# Patient Record
Sex: FEMALE | Race: BLACK OR AFRICAN AMERICAN | URBAN - METROPOLITAN AREA
[De-identification: names, ages, dates, MRNs, and addresses within clinical notes are randomized per-mention and may not be internally consistent; named-entity substitution may affect disease eponyms.]

---

## 2022-09-09 ENCOUNTER — HOSPITAL ENCOUNTER (OUTPATIENT)
Dept: LAB | Age: 42
Discharge: HOME | End: 2022-09-09
Payer: COMMERCIAL

## 2022-09-09 ENCOUNTER — OFFICE VISIT (OUTPATIENT)
Dept: FAMILY MEDICINE CLINIC | Age: 42
End: 2022-09-09
Payer: OTHER GOVERNMENT

## 2022-09-09 VITALS
TEMPERATURE: 98.1 F | SYSTOLIC BLOOD PRESSURE: 149 MMHG | OXYGEN SATURATION: 98 % | DIASTOLIC BLOOD PRESSURE: 91 MMHG | WEIGHT: 206.8 LBS | RESPIRATION RATE: 16 BRPM | HEIGHT: 69 IN | BODY MASS INDEX: 30.63 KG/M2 | HEART RATE: 62 BPM

## 2022-09-09 DIAGNOSIS — R53.82 CHRONIC FATIGUE: ICD-10-CM

## 2022-09-09 DIAGNOSIS — Z83.3 FAMILY HISTORY OF DIABETES MELLITUS IN FATHER: ICD-10-CM

## 2022-09-09 DIAGNOSIS — G89.29 CHRONIC PAIN OF RIGHT KNEE: ICD-10-CM

## 2022-09-09 DIAGNOSIS — R03.0 ELEVATED BP WITHOUT DIAGNOSIS OF HYPERTENSION: ICD-10-CM

## 2022-09-09 DIAGNOSIS — M25.561 CHRONIC PAIN OF RIGHT KNEE: ICD-10-CM

## 2022-09-09 DIAGNOSIS — D17.9 MULTIPLE LIPOMAS: ICD-10-CM

## 2022-09-09 DIAGNOSIS — Z86.2 HISTORY OF IRON DEFICIENCY ANEMIA: ICD-10-CM

## 2022-09-09 DIAGNOSIS — Z13.220 LIPID SCREENING: ICD-10-CM

## 2022-09-09 DIAGNOSIS — Z82.49 FAMILY HISTORY OF HYPERTENSION IN MOTHER: ICD-10-CM

## 2022-09-09 DIAGNOSIS — Z12.31 BREAST CANCER SCREENING BY MAMMOGRAM: Primary | ICD-10-CM

## 2022-09-09 LAB
25(OH)D3 SERPL-MCNC: 8.7 NG/ML (ref 30–100)
ALBUMIN SERPL-MCNC: 4 G/DL (ref 3.4–5)
ALBUMIN/GLOB SERPL: 1.1 {RATIO} (ref 0.8–1.7)
ALP SERPL-CCNC: 118 U/L (ref 45–117)
ALT SERPL-CCNC: 36 U/L (ref 13–56)
ANION GAP SERPL CALC-SCNC: 5 MMOL/L (ref 3–18)
AST SERPL-CCNC: 21 U/L (ref 10–38)
BASOPHILS # BLD: 0.1 K/UL (ref 0–0.1)
BASOPHILS NFR BLD: 1 % (ref 0–2)
BILIRUB SERPL-MCNC: 0.4 MG/DL (ref 0.2–1)
BUN SERPL-MCNC: 11 MG/DL (ref 7–18)
BUN/CREAT SERPL: 15 (ref 12–20)
CALCIUM SERPL-MCNC: 9.7 MG/DL (ref 8.5–10.1)
CHLORIDE SERPL-SCNC: 108 MMOL/L (ref 100–111)
CHOLEST SERPL-MCNC: 221 MG/DL
CO2 SERPL-SCNC: 27 MMOL/L (ref 21–32)
CREAT SERPL-MCNC: 0.75 MG/DL (ref 0.6–1.3)
DIFFERENTIAL METHOD BLD: ABNORMAL
EOSINOPHIL # BLD: 0.2 K/UL (ref 0–0.4)
EOSINOPHIL NFR BLD: 2 % (ref 0–5)
ERYTHROCYTE [DISTWIDTH] IN BLOOD BY AUTOMATED COUNT: 18 % (ref 11.6–14.5)
ERYTHROCYTE [SEDIMENTATION RATE] IN BLOOD: 23 MM/HR (ref 0–20)
EST. AVERAGE GLUCOSE BLD GHB EST-MCNC: 120 MG/DL
GLOBULIN SER CALC-MCNC: 3.8 G/DL (ref 2–4)
GLUCOSE SERPL-MCNC: 101 MG/DL (ref 74–99)
HBA1C MFR BLD: 5.8 % (ref 4.2–5.6)
HCT VFR BLD AUTO: 33 % (ref 35–45)
HDLC SERPL-MCNC: 67 MG/DL (ref 40–60)
HDLC SERPL: 3.3 {RATIO} (ref 0–5)
HGB BLD-MCNC: 10.5 G/DL (ref 12–16)
IMM GRANULOCYTES # BLD AUTO: 0 K/UL (ref 0–0.04)
IMM GRANULOCYTES NFR BLD AUTO: 0 % (ref 0–0.5)
LDLC SERPL CALC-MCNC: 144.6 MG/DL (ref 0–100)
LIPID PROFILE,FLP: ABNORMAL
LYMPHOCYTES # BLD: 2.1 K/UL (ref 0.9–3.6)
LYMPHOCYTES NFR BLD: 33 % (ref 21–52)
MCH RBC QN AUTO: 23.8 PG (ref 24–34)
MCHC RBC AUTO-ENTMCNC: 31.8 G/DL (ref 31–37)
MCV RBC AUTO: 74.8 FL (ref 78–100)
MONOCYTES # BLD: 0.4 K/UL (ref 0.05–1.2)
MONOCYTES NFR BLD: 6 % (ref 3–10)
NEUTS SEG # BLD: 3.6 K/UL (ref 1.8–8)
NEUTS SEG NFR BLD: 58 % (ref 40–73)
NRBC # BLD: 0 K/UL (ref 0–0.01)
NRBC BLD-RTO: 0 PER 100 WBC
PLATELET # BLD AUTO: 397 K/UL (ref 135–420)
PMV BLD AUTO: 11.5 FL (ref 9.2–11.8)
POTASSIUM SERPL-SCNC: 4.3 MMOL/L (ref 3.5–5.5)
PROT SERPL-MCNC: 7.8 G/DL (ref 6.4–8.2)
RBC # BLD AUTO: 4.41 M/UL (ref 4.2–5.3)
SODIUM SERPL-SCNC: 140 MMOL/L (ref 136–145)
TRIGL SERPL-MCNC: 47 MG/DL (ref ?–150)
TSH SERPL DL<=0.05 MIU/L-ACNC: 1.49 UIU/ML (ref 0.36–3.74)
VLDLC SERPL CALC-MCNC: 9.4 MG/DL
WBC # BLD AUTO: 6.2 K/UL (ref 4.6–13.2)

## 2022-09-09 PROCEDURE — 36415 COLL VENOUS BLD VENIPUNCTURE: CPT

## 2022-09-09 PROCEDURE — 80061 LIPID PANEL: CPT

## 2022-09-09 PROCEDURE — 99203 OFFICE O/P NEW LOW 30 MIN: CPT | Performed by: NURSE PRACTITIONER

## 2022-09-09 PROCEDURE — 85025 COMPLETE CBC W/AUTO DIFF WBC: CPT

## 2022-09-09 PROCEDURE — 85652 RBC SED RATE AUTOMATED: CPT

## 2022-09-09 PROCEDURE — 80053 COMPREHEN METABOLIC PANEL: CPT

## 2022-09-09 PROCEDURE — 82306 VITAMIN D 25 HYDROXY: CPT

## 2022-09-09 PROCEDURE — 86431 RHEUMATOID FACTOR QUANT: CPT

## 2022-09-09 PROCEDURE — 83036 HEMOGLOBIN GLYCOSYLATED A1C: CPT

## 2022-09-09 PROCEDURE — 84443 ASSAY THYROID STIM HORMONE: CPT

## 2022-09-09 NOTE — PROGRESS NOTES
Patient states her last menstrual cycle was September 4, 2022. Patient do not have covid booster. Aminata Shea presents today for   Chief Complaint   Patient presents with    Establish Care    Knee Pain       Is someone accompanying this pt? no    Is the patient using any DME equipment during OV? no    Depression Screening:  3 most recent PHQ Screens 9/9/2022   Little interest or pleasure in doing things Not at all   Feeling down, depressed, irritable, or hopeless Not at all   Total Score PHQ 2 0       Learning Assessment:  Learning Assessment 9/9/2022   PRIMARY LEARNER Patient   HIGHEST LEVEL OF EDUCATION - PRIMARY LEARNER  GRADUATED HIGH SCHOOL OR GED   BARRIERS PRIMARY LEARNER NONE   PRIMARY LANGUAGE ENGLISH   LEARNER PREFERENCE PRIMARY DEMONSTRATION   ANSWERED BY Patient   RELATIONSHIP SELF       Health Maintenance reviewed and discussed and ordered per Provider. Health Maintenance Due   Topic Date Due    Depression Screen  Never done    DTaP/Tdap/Td series (1 - Tdap) Never done    Cervical cancer screen  Never done    COVID-19 Vaccine (3 - Booster for Pfizer series) 11/23/2021   . Coordination of Care:  1. Have you been to the ER, urgent care clinic since your last visit? Hospitalized since your last visit? No     2. Have you seen or consulted any other health care providers outside of the [address] [place] [address] since your last visit? Include any pap smears or colon screening. No          3. For patients aged 39-70: Has the patient had a colonoscopy / FIT/ Cologuard? NA - based on age      If the patient is female:    4. For patients aged 41-77: Has the patient had a mammogram within the past 2 years? No       5. For patients aged 21-65: Has the patient had a pap smear? No Patient states she got this done last year in November.

## 2022-09-09 NOTE — PROGRESS NOTES
The Aminata Shea 37 y.o. 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 Negative for chills, fever and weight loss. Eyes: Negative. Respiratory:  Negative for cough, shortness of breath and wheezing. Cardiovascular:  Negative for chest pain, palpitations and leg swelling. Gastrointestinal:  Negative for abdominal pain, blood in stool, constipation and diarrhea. Genitourinary:  Negative for frequency, hematuria and urgency. Musculoskeletal:  Positive for joint pain. Negative for back pain, myalgias and neck pain. Right knee pain   Skin:         Lump to right shoulder/left shoulder, upper back and middle back    Neurological:  Negative for dizziness, weakness and headaches. Endo/Heme/Allergies:  Negative for environmental allergies. Does not bruise/bleed easily. Psychiatric/Behavioral:  Negative for depression, substance abuse and suicidal ideas. The patient is not nervous/anxious and does not have insomnia. Health Maintenance Due   Topic Date Due    Hepatitis C Screening  Never done    Depression Screen  Never done    DTaP/Tdap/Td series (1 - Tdap) Never done    Cervical cancer screen  Never done    Lipid Screen  Never done    COVID-19 Vaccine (3 - Booster for Pfizer series) 11/23/2021    Flu Vaccine (1) Never done        History reviewed. No pertinent past medical history. Physical Exam  Constitutional:       General: She is not in acute distress. Appearance: She is obese. She is not toxic-appearing. Cardiovascular:      Rate and Rhythm: Normal rate and regular rhythm. Pulses: Normal pulses. Heart sounds: Normal heart sounds. No murmur heard.   Pulmonary:      Effort: Pulmonary effort is normal. No respiratory distress. Breath sounds: Normal breath sounds. No wheezing. Abdominal:      General: There is no distension. Palpations: Abdomen is soft. There is no mass. Tenderness: There is no abdominal tenderness. Hernia: No hernia is present. Musculoskeletal:         General: Normal range of motion. Right lower leg: No edema. Left lower leg: No edema. Skin:     Comments: Upper back golf ball mobile mass-tender  Left flank softball mobile mass  Right shoulder golf ball size mass-tender  Left inner arm golf ball    Neurological:      General: No focal deficit present. Mental Status: She is alert and oriented to person, place, and time. Visit Vitals  BP (!) 149/91   Pulse 62   Temp 98.1 °F (36.7 °C) (Temporal)   Resp 16   Ht 5' 9\" (1.753 m)   Wt 206 lb 12.8 oz (93.8 kg)   SpO2 98%   BMI 30.54 kg/m²       Current Outpatient Medications:     ergocalciferol (ERGOCALCIFEROL) 1,250 mcg (50,000 unit) capsule, Take 1 Capsule by mouth every seven (7) days. , Disp: 12 Capsule, Rfl: 2    omega 3-DHA-EPA-fish oil (Fish OiL) 1,000 mg (120 mg-180 mg) capsule, Take 1 Capsule by mouth daily. , Disp: 90 Capsule, Rfl: 2    ferrous gluconate 324 mg (38 mg iron) tablet, Take  by mouth Daily (before breakfast). , Disp: , Rfl:         ASSESSMENT and PLAN    ICD-10-CM ICD-9-CM    1. Breast cancer screening by mammogram  Z12.31 V76.12 [facility] MAMMO BI SCREENING INCL CAD      2. Family history of hypertension in mother  Z80.55 V14.46       3. Elevated BP without diagnosis of hypertension  J08.3 146.0 METABOLIC PANEL, COMPREHENSIVE      4. Family history of diabetes mellitus in father  Z83.3 V18.0 HEMOGLOBIN A1C WITH EAG      5. History of iron deficiency anemia  Z86.2 V12.3 CBC WITH AUTOMATED DIFF      6. Chronic pain of right knee  M25.561 719.46 XR KNEE RT MIN 4 V    G89.29 338.29 SED RATE (ESR)      RHEUMATOID FACTOR, IGM      7.  Chronic fatigue  R53.82 780.79 VITAMIN D, 25 HYDROXY      TSH 3RD GENERATION      8.  Lipid screening  Z13.220 V77.91 LIPID PANEL      9. Multiple lipomas  D17.9 214.9 REFERRAL TO SURGERY        Follow-up and Dispositions    Return in about 2 weeks (around 9/23/2022) for BP check and then 4 month f/u .       lab results and schedule of future lab studies reviewed with patient  reviewed diet, exercise and weight control  cardiovascular risk and specific lipid/LDL goals reviewed  reviewed medications and side effects in detail    Noe Lockett NP

## 2022-09-13 ENCOUNTER — OFFICE VISIT (OUTPATIENT)
Dept: SURGERY | Age: 42
End: 2022-09-13
Payer: COMMERCIAL

## 2022-09-13 VITALS
OXYGEN SATURATION: 100 % | BODY MASS INDEX: 30.66 KG/M2 | WEIGHT: 207 LBS | HEART RATE: 66 BPM | SYSTOLIC BLOOD PRESSURE: 145 MMHG | TEMPERATURE: 97.3 F | DIASTOLIC BLOOD PRESSURE: 85 MMHG | HEIGHT: 69 IN

## 2022-09-13 DIAGNOSIS — R22.2 MASS ON BACK: ICD-10-CM

## 2022-09-13 DIAGNOSIS — M79.89 SOFT TISSUE MASS: Primary | ICD-10-CM

## 2022-09-13 DIAGNOSIS — R19.00 LEFT FLANK MASS: ICD-10-CM

## 2022-09-13 DIAGNOSIS — R22.31 ARM MASS, RIGHT: ICD-10-CM

## 2022-09-13 DIAGNOSIS — R22.32 ARM MASS, LEFT: ICD-10-CM

## 2022-09-13 PROCEDURE — 99204 OFFICE O/P NEW MOD 45 MIN: CPT | Performed by: SURGERY

## 2022-09-13 NOTE — PROGRESS NOTES
Markus Lopez is a 37 y.o. female (: 1980) presenting to address:    Chief Complaint   Patient presents with    New Patient     Masses on mid upper back, left side of abd, and bilateral inner arms/tender to touch/ referred by Rossi Espinal NP       Medication list and allergies have been reviewed with Makrus Lopez and updated as of today's date. I have gone over all Medical, Surgical and Social History with Markus Lopez and updated/added the information accordingly.

## 2022-09-13 NOTE — PATIENT INSTRUCTIONS
If you have any questions or concerns about today's appointment, the verbal and/or written instructions you were given for follow up care, please call our office at [phone]. [facility] - [address]    [phone] office  [phone] fax       PATIENT PRE AND POST OPERATIVE INSTRUCTIONS       [facility]   [place], [address]  [phone]    Before Surgery Instructions:   1) You must have someone available to drive you to and from your procedure and stay with you for the first 24 hours. 2) It is very important that you have nothing to eat or drink after midnight the night before your surgery. This includes chewing gum or sucking on hard candy. Take only heart, blood pressure and cholesterol medications the morning of surgery with only a sip of water. 3) Please stop taking Plavix 5-7 days prior to your surgery with prescribing physician approval.  Stop taking Coumadin 5 days prior to your surgery with prescribing physician approval.  Stop taking all Aspirin or Aspirin containing products 7 days prior to your surgery. Stop taking Advil, Motrin, Aleve, and etc. 3 days prior to your surgery. 4) If you take any diabetic medications please consult with your primary care physician on how to take them on the day of your surgery  5) Please stop all Herbal products 2 weeks prior to your surgery. 6) Please arrive at the hospital 2 hours prior to your surgery, unless you have been otherwise instructed. 7) Patients having an operation on their colon will be given a separate instruction sheet on their Bowel Prep. 8) For any pre-operative work up check in at the main entrance to [facility], and then go to Patient Registration. These studies are done on a walk in basis they are open from 7:00am to 5:00pm Monday through Friday.   9) A urine drug screen will be performed on the day of surgery. Please be advised if your drug screen test result is positive for any illegal substances your surgery is subject to cancellation. 10) Please wash your surgical site the morning of your surgery with soap and water. 11) If you are of child bearing age you will have pregnancy test done the morning of your surgery as soon as you arrive. 12) You're surgery time is subject to change. At times this is necessary due to equipment or staffing needs. Please be advised it's your responsibility to notify our office of any changes to your healthcare coverage. Failure to notify our office of any changes to your health care coverage may result in denial of payment by your health insurance for all incurred services and you would be responsible for payment for all incurred services. After Surgery Instructions: You will need to be seen in the office for a follow-up visit 7-14 days after your surgery. Please call after you have had the procedure to make this appointment. Unless otherwise instructed, you may remove your outer bandage and shower 48 hours after your surgery. If you develop a fever greater than 101, have any significant drainage, bleeding, swelling and/or pus of the wound. Please call our office immediately. Surgery Date and Time:  Wednesday, September 28, 2022 at 12:30pm     Please enter DR. ROBERT[facility]S [facility]\"" main entrance on the first floor and go to Patient Registration. Once registered, a member of our team will escort you to the second floor. Please check in by 10:30am the day of your surgery. You may contact Jesse Mix with any questions at [phone].

## 2022-09-13 NOTE — PROGRESS NOTES
General Surgery Consult    Tona Urena  Admit date: (Not on file)    MRN: [id number]     : 1980     Age: 37 y.o. Attending Physician: Taylor Mcmahon MD, [facility]      History of Present Illness:      Tona Urena is a 37 y.o. female who was referred to me by Jason Handley for evaluation of multiple soft tissue masses. When I asked the patient about her family history she stated that only her sister had a soft tissue mass on her right shoulder it was only 1 and no one in the family has multiple soft tissue masses. She stated that 2 of the mass is the one located on the back and left flank has been there for many years including maybe 4 to 5 years and the one on the left arm and the right arm has been there for a year or 2. According to her the one on the left flank is the largest and during the day have slightly increased in size but it is very minimal.  They are causing some discomfort sometimes but no significant pain. The patient would like them to be removed. There are no problems to display for this patient. History reviewed. No pertinent past medical history. Past Surgical History:   Procedure Laterality Date    HX BREAST REDUCTION Bilateral     2017    HX  SECTION      x 3    HX TUBAL LIGATION      2017      Social History     Tobacco Use    Smoking status: Never    Smokeless tobacco: Never   Substance Use Topics    Alcohol use: Yes     Comment: occ      Social History     Tobacco Use   Smoking Status Never   Smokeless Tobacco Never     Family History   Problem Relation Age of Onset    High Cholesterol Mother     Hypertension Mother     Diabetes Father     Cancer Sister       No current outpatient medications on file. No current facility-administered medications for this visit. No Known Allergies       Review of Systems:  Pertinent items are noted in the History of Present Illness.     Objective:     Visit Vitals  BP (!) 145/85 (BP 1 Location: Left upper arm, BP Patient Position: Sitting, BP Cuff Size: Large adult)   Pulse 66   Temp 97.3 °F (36.3 °C) (Skin)   Ht 5' 9\" (1.753 m)   Wt 93.9 kg (207 lb)   LMP 09/03/2022   SpO2 100%   BMI 30.57 kg/m²       Physical Exam:      General:  in no apparent distress, alert, oriented times 3, and afebrile   Eyes:  conjunctivae and sclerae normal, pupils equal, round, reactive to light   Throat & Neck: no erythema or exudates noted and neck supple and symmetrical; no palpable masses   Lungs:   clear to auscultation bilaterally   Heart:  Regular rate and rhythm   Abdomen:   rounded, soft, nontender, nondistended, no masses or organomegaly. There is a relatively large left flank mass measuring about 5 to 7 cm and it seems to be soft nontender consistent with a lipoma. Extremities: On the left upper arm there is a soft tissue mass measuring around 3 to 4 cm that is soft nontender and multiloculated consistent with a possible lipoma. On the right upper arm there is a similar mass that is also about 3 to 4 cm and also it is soft nontender and easily movable consistent with also a possible lipoma. Back: There is a soft tissue mass on the back located in the middle that seems to be deep intramuscular lipoma that is around 4 to 5 cm and it is soft and partially movable because seems to be intramuscular.        Imaging and Lab Review:     CBC:   Lab Results   Component Value Date/Time    WBC 6.2 09/09/2022 10:25 AM    RBC 4.41 09/09/2022 10:25 AM    HGB 10.5 (L) 09/09/2022 10:25 AM    HCT 33.0 (L) 09/09/2022 10:25 AM    PLATELET 291 29/29/9209 10:25 AM     BMP:   Lab Results   Component Value Date/Time    Glucose 101 (H) 09/09/2022 10:25 AM    Sodium 140 09/09/2022 10:25 AM    Potassium 4.3 09/09/2022 10:25 AM    Chloride 108 09/09/2022 10:25 AM    CO2 27 09/09/2022 10:25 AM    BUN 11 09/09/2022 10:25 AM    Creatinine 0.75 09/09/2022 10:25 AM    Calcium 9.7 09/09/2022 10:25 AM     CMP:  Lab Results   Component Value Date/Time    Glucose 101 (H) 09/09/2022 10:25 AM    Sodium 140 09/09/2022 10:25 AM    Potassium 4.3 09/09/2022 10:25 AM    Chloride 108 09/09/2022 10:25 AM    CO2 27 09/09/2022 10:25 AM    BUN 11 09/09/2022 10:25 AM    Creatinine 0.75 09/09/2022 10:25 AM    Calcium 9.7 09/09/2022 10:25 AM    Anion gap 5 09/09/2022 10:25 AM    BUN/Creatinine ratio 15 09/09/2022 10:25 AM    Alk. phosphatase 118 (H) 09/09/2022 10:25 AM    Protein, total 7.8 09/09/2022 10:25 AM    Albumin 4.0 09/09/2022 10:25 AM    Globulin 3.8 09/09/2022 10:25 AM    A-G Ratio 1.1 09/09/2022 10:25 AM       No results found for this or any previous visit (from the past 24 hour(s)). images and reports reviewed    Assessment:   Leanna Reed is a 37 y.o. female who is presenting with multiple soft tissue masses that all of them on examination looks like lipomas. The patient would like them to be reexcised and I explained to her that it to be done under anesthesia in the operating room because the one on the left flank and the back are deep and large and having to give her local anesthesia in the office for 4 different location and that the pain is challenging. I also gave her the option of observation but for sure she would like them to be removed so we decided to proceed with scheduling her surgery. Plan:      We will schedule the patient for excision of multiple soft tissue masses of the left flank, back, left arm and right arm    Please call me if you have any questions (cell phone: [phone])     Signed By: Trish Bedoya MD     September 13, 2022

## 2022-09-16 LAB — CANNABINOIDS BLD CFM-MCNC: <7 U (ref 0–15)

## 2022-09-23 ENCOUNTER — DOCUMENTATION ONLY (OUTPATIENT)
Dept: FAMILY MEDICINE CLINIC | Age: 42
End: 2022-09-23

## 2022-09-23 ENCOUNTER — TELEPHONE (OUTPATIENT)
Dept: SURGERY | Age: 42
End: 2022-09-23

## 2022-09-23 ENCOUNTER — CLINICAL SUPPORT (OUTPATIENT)
Dept: FAMILY MEDICINE CLINIC | Age: 42
End: 2022-09-23

## 2022-09-23 VITALS
HEIGHT: 69 IN | SYSTOLIC BLOOD PRESSURE: 138 MMHG | RESPIRATION RATE: 16 BRPM | WEIGHT: 208.2 LBS | TEMPERATURE: 98.1 F | HEART RATE: 69 BPM | DIASTOLIC BLOOD PRESSURE: 82 MMHG | BODY MASS INDEX: 30.84 KG/M2

## 2022-09-23 DIAGNOSIS — Z01.30 BP CHECK: Primary | ICD-10-CM

## 2022-09-23 DIAGNOSIS — E55.9 VITAMIN D DEFICIENCY: Primary | ICD-10-CM

## 2022-09-23 DIAGNOSIS — E78.2 MODERATE MIXED HYPERLIPIDEMIA NOT REQUIRING STATIN THERAPY: ICD-10-CM

## 2022-09-23 RX ORDER — FERROUS GLUCONATE 324(38)MG
TABLET ORAL
COMMUNITY

## 2022-09-23 RX ORDER — ERGOCALCIFEROL 1.25 MG/1
50000 CAPSULE ORAL
Qty: 12 CAPSULE | Refills: 2 | Status: SHIPPED | OUTPATIENT
Start: 2022-09-23

## 2022-09-23 RX ORDER — GLUCOSAM/CHONDRO/HERB 149/HYAL 750-100 MG
1 TABLET ORAL DAILY
Qty: 90 CAPSULE | Refills: 2 | Status: SHIPPED | OUTPATIENT
Start: 2022-09-23

## 2022-09-23 NOTE — TELEPHONE ENCOUNTER
Left voicemail message for Ms. aYron Valentin to contact our office inquiring if she received email sent earlier this week with surgery estimate/insurance benefit coverage/if she wish to proceed with scheduled surgery on Wednesday, September 28, 2022 or would prefer to postpone surgery until  is active. Insurance benefit limited coverage $500 for surgeon/$1000 for facility and any cost above this amount is patient responsibility.

## 2022-09-23 NOTE — PROGRESS NOTES
Notified patient of lab results. Vit D and fish oil sent to pharmacy. Pt will work on low cholesterol diet and recheck in 6 months. Pt has iron pills at home and will start taking them. Denies need for prescription at this time.

## 2022-09-23 NOTE — PROGRESS NOTES
Per DEYA Basurto instructions patient presents today for a blood pressure check. Patient seated and resting for 15 minutes with both feet flat on the floor. Blood pressure taken and documented. Reported blood pressure to DEYA Basurto.

## 2022-09-26 ENCOUNTER — TELEPHONE (OUTPATIENT)
Dept: SURGERY | Age: 42
End: 2022-09-26

## 2022-09-26 NOTE — TELEPHONE ENCOUNTER
Spoke to Ms. Prosper Ghosh regarding request to cancel Wednesday, September 28, 2022 surgery with Dr. Phuc Vital due to insurance coverage. Ms. Prosper Ghosh indicate she's in the process of enrolling in [facility] and Priyanka Morton, NP will be her PCM. Will request a [facility] prime referral after Priyanka Morton, NP is activated as her PCM/to reschedule surgery with Dr. Phuc Vital.

## 2022-10-07 ENCOUNTER — TELEPHONE (OUTPATIENT)
Dept: FAMILY MEDICINE CLINIC | Age: 42
End: 2022-10-07

## 2022-10-07 ENCOUNTER — TELEPHONE (OUTPATIENT)
Dept: SURGERY | Age: 42
End: 2022-10-07

## 2022-10-07 ENCOUNTER — PATIENT MESSAGE (OUTPATIENT)
Dept: FAMILY MEDICINE CLINIC | Age: 42
End: 2022-10-07

## 2022-10-07 DIAGNOSIS — D17.9 MULTIPLE LIPOMAS: Primary | ICD-10-CM

## 2022-10-07 NOTE — TELEPHONE ENCOUNTER
Fahad Paul called inquiring if we've received the  referral.  I checked the  portal and I informed Mrs. Weaver the referral is pending for Dr. Shaunna Xavier and I'll contact her when we receive the approval.

## 2022-10-07 NOTE — PROGRESS NOTES
Sent generic referral to surgery because patient has  Prime. Pt has already seen Dr. Miles Cabral for lipomas but insurance did not cover.

## 2022-10-07 NOTE — TELEPHONE ENCOUNTER
Spoke with Mrs. Lucita Dolan regarding scheduled appointment on Tuesday, October 11, 2022 with Dr. Mirlande Renteria to inform appointment wouldn't be necessary since she was seen within the last month. We're able to reschedule the surgery however upon review of [employer] portal the referral has the rendering provider listed as Gordy Whit[facility] in [place], [place]. Mrs. Weaver states she called her PCP to request the referral today and she's not sure why the referral would be to another surgeon since Ne Todd NP had already given her a referral and she was recently seen by Dr. Mirlande Renteria. Mrs. Weaver states she's at work and is unable to call her PCP at this moment due to long hold time. Mrs. Weaver asked what would she have to do. I explained if she'd like to be seen by Dr. Mirlande Renteria her PCP would have to submit a referral update request to /change rendering physician to Dr. Mirlande Renteria. I told Mrs. Weaver I would forward a message to her PCP, will cancel appointment on Tuesday, October 11, 2022 and when she get a moment she can reach out to her PCP.

## 2022-10-14 ENCOUNTER — PATIENT MESSAGE (OUTPATIENT)
Dept: FAMILY MEDICINE CLINIC | Age: 42
End: 2022-10-14

## 2022-10-18 ENCOUNTER — TELEPHONE (OUTPATIENT)
Dept: SURGERY | Age: 42
End: 2022-10-18

## 2022-10-18 NOTE — TELEPHONE ENCOUNTER
Kj David Diaz called inquiring about  prime referral.  Informed per  portal referral was approved for [id number] [address] [facility] and [facility] has requested an update and faxed clinicals per  request also. Will inquire with PCM.

## 2022-10-19 ENCOUNTER — PATIENT MESSAGE (OUTPATIENT)
Dept: FAMILY MEDICINE CLINIC | Age: 42
End: 2022-10-19

## 2024-06-06 ENCOUNTER — OFFICE VISIT (OUTPATIENT)
Age: 44
End: 2024-06-06
Payer: OTHER GOVERNMENT

## 2024-06-06 VITALS — WEIGHT: 217 LBS | HEIGHT: 69 IN | BODY MASS INDEX: 32.14 KG/M2

## 2024-06-06 DIAGNOSIS — M77.8 TENDINITIS OF LEFT SHOULDER: ICD-10-CM

## 2024-06-06 DIAGNOSIS — M25.612 DECREASED RANGE OF MOTION OF LEFT SHOULDER: ICD-10-CM

## 2024-06-06 DIAGNOSIS — M25.812 IMPINGEMENT OF LEFT SHOULDER: ICD-10-CM

## 2024-06-06 DIAGNOSIS — M25.512 LEFT SHOULDER PAIN, UNSPECIFIED CHRONICITY: ICD-10-CM

## 2024-06-06 DIAGNOSIS — M75.52 SUBACROMIAL BURSITIS OF LEFT SHOULDER JOINT: Primary | ICD-10-CM

## 2024-06-06 PROCEDURE — 99204 OFFICE O/P NEW MOD 45 MIN: CPT | Performed by: PHYSICIAN ASSISTANT

## 2024-06-06 PROCEDURE — 73030 X-RAY EXAM OF SHOULDER: CPT | Performed by: PHYSICIAN ASSISTANT

## 2024-06-06 PROCEDURE — 20610 DRAIN/INJ JOINT/BURSA W/O US: CPT | Performed by: PHYSICIAN ASSISTANT

## 2024-06-06 RX ORDER — TRIAMCINOLONE ACETONIDE 40 MG/ML
40 INJECTION, SUSPENSION INTRA-ARTICULAR; INTRAMUSCULAR ONCE
Status: COMPLETED | OUTPATIENT
Start: 2024-06-06 | End: 2024-06-06

## 2024-06-06 RX ORDER — DICLOFENAC SODIUM 75 MG/1
75 TABLET, DELAYED RELEASE ORAL 2 TIMES DAILY
Qty: 60 TABLET | Refills: 3 | Status: SHIPPED | OUTPATIENT
Start: 2024-06-06

## 2024-06-06 RX ORDER — BUPIVACAINE HYDROCHLORIDE 5 MG/ML
7 INJECTION, SOLUTION PERINEURAL ONCE
Status: COMPLETED | OUTPATIENT
Start: 2024-06-06 | End: 2024-06-06

## 2024-06-06 RX ADMIN — BUPIVACAINE HYDROCHLORIDE 35 MG: 5 INJECTION, SOLUTION PERINEURAL at 11:09

## 2024-06-06 RX ADMIN — TRIAMCINOLONE ACETONIDE 40 MG: 40 INJECTION, SUSPENSION INTRA-ARTICULAR; INTRAMUSCULAR at 11:21

## 2024-06-06 NOTE — PROGRESS NOTES
at bedside left shoulder examined to reveal skin intact.  No warmth, erythema, edema, or ecchymosis.  No effusions present.  Pain noted in the AC joint to direct palpation of.  Her active forward flexion is poor today against resistance barely at 60 degrees.  There is a positive Laila sign.  Passive external rotation 25 degrees with pain and guarding.  Internal rotation actively is to the posterior aspect of the left iliac crest.    Distal sensation intact fully left upper extremity.    Left elbow active range of motion 100 degrees -0 with no pain or guarding no instability.          IMAGING:  Imaging read by myself and interpreted as follows:  X-ray: [facility] 6/6/2024 space 3 view of the left shoulder to include Y reveals early glenoid osteoarthritic change noted.  No acute lesions or masses noted.  No fracture deformities.  No soft tissue ossifications.    IMPRESSION:      ICD-10-CM    1. Left shoulder pain, unspecified chronicity  M25.512 [[id number]] Shoulder 2V or more      2. Decreased range of motion of left shoulder  M25.612       3. Subacromial bursitis of left shoulder joint  M75.52       4. Impingement of left shoulder  M25.812       5. Tendinitis of left shoulder  M77.8            PLAN:  Today we discussed alternatives care to include but not limited to a formal course of outpatient physical therapy in addition to a low-dose cortisone injection for her shoulder impingement syndrome with pain.  Consideration for MRI if patient fails to improve and/or worsens with current treatment.  Also I recommended early next week starting diclofenac 75 mg tablets 1 p.o. twice daily with food.  That was sent to her pharmacy on file.    Procedural: Using sterile technique and verbal and written consent obtained appropriate timeout performed patient sitting on exam table in a toga presentation as I am joined by my chaperone Shelley NEWELL using the posterior subacromial approach 1 cc of Kenalog at 40 mg per Melamix with

## 2024-06-10 ENCOUNTER — TELEPHONE (OUTPATIENT)
Age: 44
End: 2024-06-10

## 2024-06-10 DIAGNOSIS — M25.612 DECREASED RANGE OF MOTION OF LEFT SHOULDER: Primary | ICD-10-CM

## 2024-06-10 DIAGNOSIS — M75.52 SUBACROMIAL BURSITIS OF LEFT SHOULDER JOINT: ICD-10-CM

## 2024-06-10 DIAGNOSIS — M25.812 IMPINGEMENT OF LEFT SHOULDER: ICD-10-CM

## 2024-06-10 NOTE — TELEPHONE ENCOUNTER
Patient called to request her PT referral be sent to the [facility] for scheduling.

## 2024-07-02 ENCOUNTER — HOSPITAL ENCOUNTER (OUTPATIENT)
Facility: HOSPITAL | Age: 44
Setting detail: RECURRING SERIES
Discharge: HOME | End: 2024-07-05
Payer: OTHER GOVERNMENT

## 2024-07-02 PROCEDURE — 97530 THERAPEUTIC ACTIVITIES: CPT

## 2024-07-02 PROCEDURE — 97161 PT EVAL LOW COMPLEX 20 MIN: CPT

## 2024-07-02 PROCEDURE — 97110 THERAPEUTIC EXERCISES: CPT

## 2024-07-02 NOTE — PROGRESS NOTES
PT DAILY TREATMENT NOTE/SHOULDER EVAL     Patient Name: Malathi Zuluaga    Date: 2024    : 1980  Insurance: Payor: [employer] EAST / Plan:  EAST SELECT / Product Type: *No Product type* /      Patient  verified yes     Visit #   Current / Total 1 10   Time   In / Out 4:22 5:10   Pain   In / Out 0 0   Subjective Functional Status/Changes: See POC   Changes to:  Meds, Allergies, Med Hx, Sx Hx?  If yes, update Summary List yes, see POC     Treatment Area: Left shoulder pain [M25.512]    SUBJECTIVE    CC: Left shoulder pain and decreased L shoulder mobility  History/Mechanism of Injury: lifting heavy at work in 2024  Current Symptoms/Complaints: decreased mobility in L shoulder, L shoulder pain but better since Cortizone injections last month  Pain-  Current: 0/10     Worst: 10/10   Best: 6/10  Aggravated By: lifting arm  Alleviated By: not moving arm  Previous Treatment/Compliance: none  PMHx/Surgical Hx: shoulder injury early last year that was resolved and did not last as long  Work Hx: cleaning houses  Living Situation: home  Hobbies: swimming and gardening  PLOF: carrying cleaning equipment on shoulder, dusting, lifting heavy objects, independent with self care  Limitations to PLOF: L shoulder pain limits reaching OH, inability to wash back or get dressed independently  Pt Goals: regain L shoulder mobility, carry heavy objects for work    OBJECTIVE/EXAMINATION    25 min [x]Eval  - untimed                 Therapeutic Procedures:  Tx Min Billable or 1:1 Min (if diff from Tx Min) Procedure, Rationale, Specifics   15   37046 Therapeutic Exercise (timed):  increase ROM, strength, coordination, balance, and proprioception to improve patient's ability to progress to PLOF and address remaining functional goals. (see flow sheet as applicable)     Details if applicable:     7 86815 Therapeutic Activity (timed):  use of dynamic activities replicating functional movements to increase ROM, 
pain  - Goal: Pt to report quickDASH score of 19% to show improved function and quality of life.  Status at last note/certification: 38.64     Frequency / Duration: Patient would benefit from skilled PT 2 times per week for up to 10 sessions as needed in this certification period.  Goals will be assigned and reassessed every 10 visits/ 30 days per guidelines .    Patient/ Caregiver education and instruction: Diagnosis, prognosis, self care, activity modification, and exercises [x]  Plan of care has been reviewed with PTA    Ashley Mary, SPT       7/2/2024       5:43 PM  Ruslan Engle, PT  ===================================================================  I certify that the above Therapy Services are being furnished while the patient is under my care. I agree with the treatment plan and certify that this therapy is necessary.    Physician's Signature:_________________________   DATE:_________   TIME:________                           Sincere Edmondson PA-C    ** Signature, Date and Time must be completed for valid certification **  [facility] or you may fax the signed copy to [phone].  Thank you.

## 2024-07-10 ENCOUNTER — HOSPITAL ENCOUNTER (OUTPATIENT)
Facility: HOSPITAL | Age: 44
Setting detail: RECURRING SERIES
Discharge: HOME | End: 2024-07-13
Payer: OTHER GOVERNMENT

## 2024-07-10 PROCEDURE — 97140 MANUAL THERAPY 1/> REGIONS: CPT

## 2024-07-10 PROCEDURE — 97110 THERAPEUTIC EXERCISES: CPT

## 2024-07-10 PROCEDURE — 97530 THERAPEUTIC ACTIVITIES: CPT

## 2024-07-10 NOTE — PROGRESS NOTES
PLOF and address remaining functional goals. (see flow sheet as applicable)     Details if applicable:  left shoulder ROM and strengthening     8  22286 Therapeutic Activity (timed):  use of dynamic activities replicating functional movements to increase ROM, strength, coordination, balance, and proprioception in order to improve patient's ability to progress to PLOF and address remaining functional goals.  (see flow sheet as applicable)     Details if applicable:  left UE overhead reach     15  67239 Manual Therapy (timed):  decrease pain, increase ROM, and increase tissue extensibility to improve patient's ability to progress to PLOF and address remaining functional goals.  The manual therapy interventions were performed at a separate and distinct time from the therapeutic activities interventions . (see flow sheet as applicable)     Details if applicable:  DTM left pec, UT, post cuff; left shoulder PROM flex,abd, ER, IR   35  [facility] Totals Reminder: bill using total billable min of TIMED therapeutic procedures (example: do not include dry needle or estim unattended, both untimed codes, in totals to left)  8-22 min = 1 unit; 23-37 min = 2 units; 38-52 min = 3 units; 53-67 min = 4 units; 68-82 min = 5 units   Total      [x]  Patient Education billed concurrently with other procedures  [x] Review HEP    [] Progressed/Changed HEP, detail:  [] Other detail:    Objective Information/Functional Measures/Assessment:  -patient continues to present with significant limitations in abduction, ER and IR  -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

## 2024-07-15 ENCOUNTER — HOSPITAL ENCOUNTER (OUTPATIENT)
Facility: HOSPITAL | Age: 44
Setting detail: RECURRING SERIES
Discharge: HOME | End: 2024-07-18
Payer: OTHER GOVERNMENT

## 2024-07-15 PROCEDURE — 97112 NEUROMUSCULAR REEDUCATION: CPT

## 2024-07-15 PROCEDURE — 97140 MANUAL THERAPY 1/> REGIONS: CPT

## 2024-07-15 PROCEDURE — 97110 THERAPEUTIC EXERCISES: CPT

## 2024-07-15 PROCEDURE — 97530 THERAPEUTIC ACTIVITIES: CPT

## 2024-07-15 NOTE — PROGRESS NOTES
to PLOF and address remaining functional goals. (see flow sheet as applicable)     Details if applicable:  left shoulder ROM and strengthening     8  31228 Therapeutic Activity (timed):  use of dynamic activities replicating functional movements to increase ROM, strength, coordination, balance, and proprioception in order to improve patient's ability to progress to PLOF and address remaining functional goals.  (see flow sheet as applicable)     Details if applicable:  left UE overhead reach     10  31565 Neuromuscular Re-Education (timed):  improve balance, coordination, kinesthetic sense, posture, core stability and proprioception to improve patient's ability to develop conscious control of individual muscles and awareness of position of extremities in order to progress to PLOF and address remaining functional goals. (see flow sheet as applicable)     Details if applicable:  left scapular stability and rotator cuff re-ed     12  15262 Manual Therapy (timed):  decrease pain, increase ROM, and increase tissue extensibility to improve patient's ability to progress to PLOF and address remaining functional goals.  The manual therapy interventions were performed at a separate and distinct time from the therapeutic activities interventions . (see flow sheet as applicable)     Details if applicable:  DTM left pec, UT, post cuff; left shoulder PROM flex,abd, ER, IR; post/inf GH mobs grade IV     42  [facility] Totals Reminder: bill using total billable min of TIMED therapeutic procedures (example: do not include dry needle or estim unattended, both untimed codes, in totals to left)  8-22 min = 1 unit; 23-37 min = 2 units; 38-52 min = 3 units; 53-67 min = 4 units; 68-82 min = 5 units   Total      [x]  Patient Education billed concurrently with other procedures  [x] Review HEP    [] Progressed/Changed HEP, detail:  [] Other detail:    Objective Information/Functional Measures/Assessment:  -improved tolerance noted with therex

## 2024-07-24 ENCOUNTER — HOSPITAL ENCOUNTER (OUTPATIENT)
Facility: HOSPITAL | Age: 44
Setting detail: RECURRING SERIES
Discharge: HOME | End: 2024-07-27
Payer: OTHER GOVERNMENT

## 2024-07-24 PROCEDURE — 97110 THERAPEUTIC EXERCISES: CPT

## 2024-07-24 PROCEDURE — 97112 NEUROMUSCULAR REEDUCATION: CPT

## 2024-07-24 PROCEDURE — 97140 MANUAL THERAPY 1/> REGIONS: CPT

## 2024-07-24 PROCEDURE — 97530 THERAPEUTIC ACTIVITIES: CPT

## 2024-07-24 NOTE — PROGRESS NOTES
PHYSICAL / OCCUPATIONAL THERAPY - DAILY TREATMENT NOTE (updated )    Patient Name: Malathi Zuluaga    Date: 2024    : 1980  Insurance: Payor:  EAST / Plan: [employer] EAST SELECT / Product Type: *No Product type* /      Patient  verified Yes     Visit #   Current / Total 4 10   Time   In / Out 5:42 6:24   Pain   In / Out 0 0   Subjective Functional Status/Changes: \"I'm able to reach a little more.\"     TREATMENT AREA =  Left shoulder pain [M25.512]    OBJECTIVE    Modalities Rationale:     decrease pain to improve patient's ability to progress to PLOF and address remaining functional goals.     min [] Estim Unattended, type/location:                                      []  w/ice    []  w/heat    min [] Estim Attended, type/location:                                     []  w/US     []  w/ice    []  w/heat    []  TENS insruct      min []  Mechanical Traction: type/lbs                   []  pro   []  sup   []  int   []  cont    []  before manual    []  after manual    min []  Ultrasound, settings/location:      min []  Iontophoresis w/ dexamethasone, location:                                               []  take home patch       []  in clinic   10 min  unbill []  Ice     [x]  Heat    location/position: Supine left shoulder with knee wedge    min []  Paraffin,  details:     min []  Vasopneumatic Device, press/temp:     min []  Whirlpool / Fluido:    If using vaso (only need to measure limb vaso being performed on)      pre-treatment girth :       post-treatment girth :       measured at (landmark location) :      min []  Other:    Skin assessment post-treatment (if applicable):    [x]  intact    []  redness- no adverse reaction                 []redness - adverse reaction:        Therapeutic Procedures:  Tx Min  Procedure, Rationale, Specifics   12  30858 Therapeutic Exercise (timed):  increase ROM, strength, coordination, balance, and proprioception to improve patient's ability to progress

## 2024-07-30 ENCOUNTER — APPOINTMENT (OUTPATIENT)
Facility: HOSPITAL | Age: 44
End: 2024-07-30
Payer: OTHER GOVERNMENT

## 2024-08-01 ENCOUNTER — HOSPITAL ENCOUNTER (OUTPATIENT)
Facility: HOSPITAL | Age: 44
Setting detail: RECURRING SERIES
Discharge: HOME | End: 2024-08-04
Payer: OTHER GOVERNMENT

## 2024-08-01 PROCEDURE — 97140 MANUAL THERAPY 1/> REGIONS: CPT

## 2024-08-01 PROCEDURE — 97530 THERAPEUTIC ACTIVITIES: CPT

## 2024-08-01 PROCEDURE — 97110 THERAPEUTIC EXERCISES: CPT

## 2024-08-01 NOTE — PROGRESS NOTES
[facility] [place]   [address] [place] [address]  Phone: [phone] Fax: [phone]  PROGRESS NOTE  Patient Name: Malathi Zuluaga : 1980   Treatment/Medical Diagnosis: Left shoulder pain [M25.512]   Referral Source:  Payor Sincere Edmondson PA-C  Payor:  EAST / Plan:  EAST SELECT / Product Type: *No Product type* /      Date of Initial Visit: 24 Attended Visits: 5 Missed Visits: 0     SUMMARY OF TREATMENT  Pt is a pleasant 43 y.o. female who presents with c/o L shoulder pain and decreased mobility. The patient reports difficulty with reaching OH, pain in L shoulder, and difficulty with lifting and carrying heavy objects.  Treatment has consisted of TE, TA, NMRed as appropriate, MT, postural education, MHP, pt education and HEP progression     CURRENT STATUS  Pt has attended 5 sessions from 24 to  24 making good progress with pain management, increase in L GHJ ROM, functional use of the L GHJ. She has less hike with AROM, improved straight plane movements, but still is majorly limited in GHJ abduction, ER and combinations movements (BRODERICK and FIR mostly). Pt will benefit from con't of care to address limits with pain, limited ROM for dressing, driving, reaching and reduced strength which impairs her home and yard-care as well as push/pull of doors.     Pain ranges: 0 to 3/10  Improvements: reaching, use of the L GHJ, pain   Deficits: full OH reaching, FIR  70-80% improvement  Quick dash: 20.5%  Pt goal: \" full ROM \"     Observation: scapular hiking with shoulder ROM (improved but not abolished)  Palpation: TTP at anterior shoulder over biceps LH insertion, min at pec major tendon     Shoulder AROM/PROM:                                           AROM (deg)              PROM (deg)    Right Left Right Left   Flexion 0-158 130   0-130 (guarding)   Extension  75 56       Abduction 0-166 85 deg       ER  75 45   55 deg

## 2024-08-01 NOTE — PROGRESS NOTES
PHYSICAL / OCCUPATIONAL THERAPY - DAILY TREATMENT NOTE (updated )    Patient Name: Malathi Zuluaga    Date: 2024    : 1980  Insurance: Payor:  EAST / Plan: [employer] EAST SELECT / Product Type: *No Product type* /      Patient  verified Yes     Visit #   Current / Total 5 10   Time   In / Out 5:49 6:30 (41)   Pain   In / Out 0 at rest 0 at rest   Subjective Functional Status/Changes: \"I'm moving it more (see below).\"     TREATMENT AREA =  Left shoulder pain [M25.512]    OBJECTIVE    Modalities Rationale:     decrease pain to improve patient's ability to progress to PLOF and address remaining functional goals.     min [] Estim Unattended, type/location:                                      []  w/ice    []  w/heat    min [] Estim Attended, type/location:                                     []  w/US     []  w/ice    []  w/heat    []  TENS insruct      min []  Mechanical Traction: type/lbs                   []  pro   []  sup   []  int   []  cont    []  before manual    []  after manual    min []  Ultrasound, settings/location:      min []  Iontophoresis w/ dexamethasone, location:                                               []  take home patch       []  in clinic   PD min  unbill []  Ice     [x]  Heat    location/position: Supine left shoulder with knee wedge    min []  Paraffin,  details:     min []  Vasopneumatic Device, press/temp:     min []  Whirlpool / Fluido:    If using vaso (only need to measure limb vaso being performed on)      pre-treatment girth :       post-treatment girth :       measured at (landmark location) :      min []  Other:    Skin assessment post-treatment (if applicable):    [x]  intact    []  redness- no adverse reaction                 []redness - adverse reaction:        Therapeutic Procedures:  Tx Min  Procedure, Rationale, Specifics   19  57806 Therapeutic Exercise (timed):  increase ROM, strength, coordination, balance, and proprioception to improve patient's

## 2024-08-06 ENCOUNTER — HOSPITAL ENCOUNTER (OUTPATIENT)
Facility: HOSPITAL | Age: 44
Setting detail: RECURRING SERIES
Discharge: HOME | End: 2024-08-09
Payer: OTHER GOVERNMENT

## 2024-08-06 PROCEDURE — 97112 NEUROMUSCULAR REEDUCATION: CPT

## 2024-08-06 PROCEDURE — 97110 THERAPEUTIC EXERCISES: CPT

## 2024-08-06 PROCEDURE — 97140 MANUAL THERAPY 1/> REGIONS: CPT

## 2024-08-06 NOTE — PROGRESS NOTES
PHYSICAL / OCCUPATIONAL THERAPY - DAILY TREATMENT NOTE (updated )    Patient Name: Malathi Zuluaga    Date: 2024    : 1980  Insurance: Payor:  EAST / Plan:  EAST SELECT / Product Type: *No Product type* /      Patient  verified yes     Visit #   Current / Total 1 10 Total Time   Time   In / Out 5:00 5:47 47   Pain   In / Out 3 0    Subjective Functional Status/Changes: It only hurts most of the time when I stretch it.     TREATMENT AREA =  Left shoulder pain [M25.512]    OBJECTIVE      Therapeutic Procedures:  47  Total   BC Totals Reminder: bill using total billable min of TIMED therapeutic procedures (example: do not include dry needle or estim unattended, both untimed codes, in totals to left)  8-22 min = 1 unit; 23-37 min = 2 units; 38-52 min = 3 units; 53-67 min = 4 units; 68-82 min = 5 units   Tx Min  Procedure, Rationale, Specifics   24  28390 Therapeutic Exercise (timed):  increase ROM, strength, coordination, balance, and proprioception to improve patient's ability to progress to PLOF and address remaining functional goals. (see flow sheet as applicable)   Details if applicable:       11  89771 Neuromuscular Re-Education (timed):  improve balance, coordination, kinesthetic sense, posture, core stability and proprioception to improve patient's ability to develop conscious control of individual muscles and awareness of position of extremities in order to progress to PLOF and address remaining functional goals. (see flow sheet as applicable)     Details if applicable:       12  37171 Manual Therapy (timed):  decrease pain, increase ROM, increase tissue extensibility, decrease trigger points, and increase postural awareness to improve patient's ability to progress to PLOF and address remaining functional goals.  The manual therapy interventions were performed at a separate and distinct time from the therapeutic activities interventions . (see flow sheet as applicable)

## 2024-08-08 ENCOUNTER — HOSPITAL ENCOUNTER (OUTPATIENT)
Facility: HOSPITAL | Age: 44
Setting detail: RECURRING SERIES
Discharge: HOME | End: 2024-08-11
Payer: OTHER GOVERNMENT

## 2024-08-08 PROCEDURE — 97110 THERAPEUTIC EXERCISES: CPT

## 2024-08-08 PROCEDURE — 97140 MANUAL THERAPY 1/> REGIONS: CPT

## 2024-08-08 PROCEDURE — 97112 NEUROMUSCULAR REEDUCATION: CPT

## 2024-08-08 NOTE — PROGRESS NOTES
PHYSICAL / OCCUPATIONAL THERAPY - DAILY TREATMENT NOTE (updated )    Patient Name: Maltahi Zuluaga    Date: 2024    : 1980  Insurance: Payor:  EAST / Plan:  EAST SELECT / Product Type: *No Product type* /      Patient  verified yes     Visit #   Current / Total 2 10 Total Time   Time   In / Out 5:40 PM 6:33 PM 53   Pain   In / Out 3/10 010    Subjective Functional Status/Changes: \"I am alright no new developments.\"      TREATMENT AREA =  Left shoulder pain [M25.512]    OBJECTIVE      Therapeutic Procedures:    Total  53  [facility] Totals Reminder: bill using total billable min of TIMED therapeutic procedures (example: do not include dry needle or estim unattended, both untimed codes, in totals to left)  8-22 min = 1 unit; 23-37 min = 2 units; 38-52 min = 3 units; 53-67 min = 4 units; 68-82 min = 5 units   Tx Min  Procedure, Rationale, Specifics   28  50649 Therapeutic Exercise (timed):  increase ROM, strength, coordination, balance, and proprioception to improve patient's ability to progress to PLOF and address remaining functional goals. (see flow sheet as applicable)     Details if applicable:  stretches, strengthening, UBE      11  29712 Neuromuscular Re-Education (timed):  improve balance, coordination, kinesthetic sense, posture, core stability and proprioception to improve patient's ability to develop conscious control of individual muscles and awareness of position of extremities in order to progress to PLOF and address remaining functional goals. (see flow sheet as applicable)     Details if applicable: RTC re ed, postural cuing        14  37099 Manual Therapy (timed):  decrease pain, increase ROM, increase tissue extensibility, decrease trigger points, and increase postural awareness to improve patient's ability to progress to PLOF and address remaining functional goals.  The manual therapy interventions were performed at a separate and distinct time from the therapeutic

## 2024-08-13 ENCOUNTER — HOSPITAL ENCOUNTER (OUTPATIENT)
Facility: HOSPITAL | Age: 44
Setting detail: RECURRING SERIES
End: 2024-08-13
Payer: OTHER GOVERNMENT

## 2024-08-15 ENCOUNTER — HOSPITAL ENCOUNTER (OUTPATIENT)
Facility: HOSPITAL | Age: 44
Setting detail: RECURRING SERIES
End: 2024-08-15
Payer: OTHER GOVERNMENT

## 2024-08-15 NOTE — PROGRESS NOTES
[facility] AT [address] [place] [address]  Phone: [phone] Fax [phone]  DISCHARGE SUMMARY  Patient Name: Malathi Zuluaga : 1980   Treatment/Medical Diagnosis: Left shoulder pain [M25.512]   Referral Source: Sincere Edmondson PA-C     Date of Initial Visit: 2024 Attended Visits: 7 Missed Visits: 1     SUMMARY OF TREATMENT  Patient is a pleasant 43 year old female who has been treated in PT for 7 visits for complaints of left shoulder pain and decreased mobility. Treatment thus far has consisted of therapeutic exercises to address strength/ROM deficits, therapeutic activities for functional movement restoration, neuromuscular re-education for static/dynamic stabilization, patient education on self care strategies and HEP, manual therapy to address soft tissue restrictions, and modalities for symptom modulation.      CURRENT STATUS  Unable to reassess as patient is a no visit discharge.   Patient called clinic requesting discharge noting she was staying consistent with home exercise program independently.     RECOMMENDATIONS  Discharge at this time per patient request.     If you have any questions/comments please contact us directly at [phone].   Thank you for allowing us to assist in the care of your patient.  PTA signature       Therapist Signature: Adriana Fernandez PT Date: 8/15/2024   Reporting Period: 2024-2024 Time: 6:21 PM

## 2024-08-19 ENCOUNTER — APPOINTMENT (OUTPATIENT)
Facility: HOSPITAL | Age: 44
End: 2024-08-19
Payer: OTHER GOVERNMENT

## 2024-08-22 ENCOUNTER — APPOINTMENT (OUTPATIENT)
Facility: HOSPITAL | Age: 44
End: 2024-08-22
Payer: OTHER GOVERNMENT

## 2024-08-26 ENCOUNTER — APPOINTMENT (OUTPATIENT)
Facility: HOSPITAL | Age: 44
End: 2024-08-26
Payer: OTHER GOVERNMENT

## 2024-08-29 ENCOUNTER — APPOINTMENT (OUTPATIENT)
Facility: HOSPITAL | Age: 44
End: 2024-08-29
Payer: OTHER GOVERNMENT

## 2024-10-22 NOTE — TELEPHONE ENCOUNTER
Patient called and would like another referral for a surgeon through her [address]. No doctor needs to be populated as we will let [employer] pick the surgeon when her authoriztion is completed through [address]. Diagnosis should be the same as the previous referral to . Wrist Fracture Treated With Immobilization  The bones of the hand, showing a wrist fracture.   A wrist fracture is a break or crack in one of the bones of the wrist. A broken wrist is often treated by wearing a cast, splint, or sling (immobilization). These devices hold the broken pieces in place so they can heal.    What are the causes?  This condition may be caused by:  A direct hit on the wrist.  Injury, such as a car crash or falling on an outstretched hand.  Bone conditions, such as osteoporosis.  Falls. This happens more often to older adults.  What increases the risk?  You're more likely to get this condition if:  You do contact sports or high-risk sports such as:  Skiing.  Biking.  Ice skating.  You take steroids.  You've had a fracture before.  You're female.  You're older.  You smoke.  You drink more than three alcoholic beverages a day.  What are the signs or symptoms?  Severe pain that might get worse when gripping, squeezing, or moving your hand or wrist.  Swelling and tenderness.  Bruising.  Not being able to move the wrist or hand normally.  The wrist hanging in an odd position or looking oddly shaped.  Numbness or tingling in the fingers.  How is this diagnosed?  This condition may be diagnosed based on medical history and a physical exam.    You may also have tests such as:  X-rays.  CT scan.  MRI.  How is this treated?  This condition may be treated by:  Wearing a cast, splint for 5 to 6 weeks.  Taking medicine for pain.  Doing exercises.  Follow these instructions at home:  If you have a cast:    Do not put pressure on any part of the cast until it's hard. This may take a few hours.  Do not stick anything inside it to scratch your skin. Doing this can lead to infection.  Check the skin around your cast every day. Tell your health care provider if you see problems.  It's OK to put lotion on dry skin around the cast.  Keep the cast clean and dry.  If you have a splint:    Wear the splint as told by your provider. Take it off only at the times your provider says you can.  Check the skin around it every day.  Loosen the splint if your fingers tingle, are numb, or turn cold and blue.  Keep the splint clean and dry.  Bathing    Do not take baths, swim, or use a hot tub until you're told it's OK. Ask if you can shower.  If your cast or splint isn't waterproof:  Do not let it get wet.  Cover it when you take a bath or shower. Use a cover that doesn't let any water in.  Managing pain, stiffness, and swelling    Bag of ice on a towel on the skin.   Use ice or an ice pack as told.  If you have a splint or sling that you can take off, remove it only as told.  Place a towel between your skin and the ice, or between your cast and the ice.  Leave the ice on for 20 minutes, 2–3 times a day.  If your skin turns red, take off the ice right away to prevent skin damage. The risk of damage is higher if you can't feel pain, heat, or cold.  Move your fingers often to reduce stiffness and swelling.  Raise the injured area above the level of your heart while you are sitting or lying down. Use pillows as needed.  Activity    Do not lift with your injured wrist, or put weight on it, until your provider says it's safe to do so.  Exercise as told.  Ask when it's safe to drive if you have a cast, splint, or sling on your wrist.  Ask what things are safe for you to do at home. Ask when you can go back to work or school.  Medicines    Take your medicines only as told.  You may need to take steps to help treat or prevent trouble pooping (constipation), such as:  Taking medicines to help you poop.  Eating foods high in fiber, like beans, whole grains, and fresh fruits and vegetables.  Drinking more fluids as told.  Ask your provider if it's safe to drive or use machines while taking pain medicine.  General instructions    Do not smoke, vape, or use nicotine or tobacco. Doing this can slow down healing.  Contact a health care provider if:  Your cast or splint is loose or damaged.  You have any new pain, swelling, or bruising.  Your pain, swelling, and bruising do not get better.  You have a fever or chills.  Get help right away if:  Your skin or fingers on your injured arm turn blue or gray.  Your arm feels cold or numb.  You have very bad pain in your injured wrist.  These symptoms may be an emergency. Call 911 right away  Do not wait to see if the symptoms will go away.  Do not drive yourself to the hospital.  This information is not intended to replace advice given to you by your health care provider. Make sure you discuss any questions you have with your health care provider.